# Patient Record
Sex: FEMALE | Race: OTHER | HISPANIC OR LATINO | Employment: OTHER | URBAN - METROPOLITAN AREA
[De-identification: names, ages, dates, MRNs, and addresses within clinical notes are randomized per-mention and may not be internally consistent; named-entity substitution may affect disease eponyms.]

---

## 2021-08-20 ENCOUNTER — APPOINTMENT (EMERGENCY)
Dept: RADIOLOGY | Facility: HOSPITAL | Age: 71
End: 2021-08-20

## 2021-08-20 ENCOUNTER — HOSPITAL ENCOUNTER (EMERGENCY)
Facility: HOSPITAL | Age: 71
Discharge: HOME | End: 2021-08-20
Attending: EMERGENCY MEDICINE | Admitting: EMERGENCY MEDICINE

## 2021-08-20 VITALS
HEART RATE: 70 BPM | HEIGHT: 60 IN | SYSTOLIC BLOOD PRESSURE: 115 MMHG | OXYGEN SATURATION: 98 % | RESPIRATION RATE: 18 BRPM | TEMPERATURE: 98.6 F | DIASTOLIC BLOOD PRESSURE: 42 MMHG

## 2021-08-20 DIAGNOSIS — J18.9 PNEUMONIA: Primary | ICD-10-CM

## 2021-08-20 DIAGNOSIS — R73.9 HYPERGLYCEMIA: ICD-10-CM

## 2021-08-20 LAB
ALBUMIN SERPL BCP-MCNC: 3.4 G/DL (ref 3.4–4.8)
ALP SERPL-CCNC: 102.8 U/L (ref 35–140)
ALT SERPL W P-5'-P-CCNC: 14 U/L (ref 5–54)
ANION GAP SERPL CALCULATED.3IONS-SCNC: 11 MMOL/L (ref 4–13)
APTT PPP: 28 SECONDS (ref 23–31)
AST SERPL W P-5'-P-CCNC: 21 U/L (ref 15–41)
BASE EXCESS BLDA CALC-SCNC: 3 MMOL/L (ref -2–3)
BASOPHILS # BLD MANUAL: 0 THOUSAND/UL (ref 0–0.1)
BASOPHILS NFR MAR MANUAL: 0 % (ref 0–1)
BILIRUB SERPL-MCNC: 0.32 MG/DL (ref 0.3–1.2)
BUN SERPL-MCNC: 49 MG/DL (ref 6–20)
CALCIUM ALBUM COR SERPL-MCNC: 9.5 MG/DL (ref 8.3–10.1)
CALCIUM SERPL-MCNC: 9 MG/DL (ref 8.4–10.2)
CHLORIDE SERPL-SCNC: 93 MMOL/L (ref 96–108)
CO2 SERPL-SCNC: 24 MMOL/L (ref 22–33)
CREAT SERPL-MCNC: 1.85 MG/DL (ref 0.4–1.1)
EOSINOPHIL # BLD MANUAL: 0.09 THOUSAND/UL (ref 0–0.4)
EOSINOPHIL NFR BLD MANUAL: 1 % (ref 0–6)
ERYTHROCYTE [DISTWIDTH] IN BLOOD BY AUTOMATED COUNT: 14.1 % (ref 11.6–15.1)
GFR SERPL CREATININE-BSD FRML MDRD: 27 ML/MIN/1.73SQ M
GIANT PLATELETS BLD QL SMEAR: PRESENT
GLUCOSE SERPL-MCNC: 406 MG/DL (ref 65–140)
GLUCOSE SERPL-MCNC: 431 MG/DL (ref 65–140)
HCO3 BLDA-SCNC: 28.5 MMOL/L (ref 24–30)
HCT VFR BLD AUTO: 34.9 % (ref 34.8–46.1)
HCT VFR BLD CALC: 34 % (ref 34.8–46.1)
HGB BLD-MCNC: 11.2 G/DL (ref 11.5–15.4)
HGB BLDA-MCNC: 11.6 G/DL (ref 11.5–15.4)
INR PPP: 1.05 (ref 0.9–1.1)
LACTATE SERPL-SCNC: 2.1 MMOL/L (ref 0–2)
LYMPHOCYTES # BLD AUTO: 1.77 THOUSAND/UL (ref 0.6–4.47)
LYMPHOCYTES # BLD AUTO: 19 % (ref 14–44)
MCH RBC QN AUTO: 26.4 PG (ref 26.8–34.3)
MCHC RBC AUTO-ENTMCNC: 32.1 G/DL (ref 31.4–37.4)
MCV RBC AUTO: 82 FL (ref 82–98)
MONOCYTES # BLD AUTO: 0.56 THOUSAND/UL (ref 0–1.22)
MONOCYTES NFR BLD: 6 % (ref 4–12)
NEUTROPHILS # BLD MANUAL: 6.91 THOUSAND/UL (ref 1.85–7.62)
NEUTS BAND NFR BLD MANUAL: 4 % (ref 0–8)
NEUTS SEG NFR BLD AUTO: 70 % (ref 43–75)
PCO2 BLD: 30 MMOL/L (ref 21–32)
PCO2 BLD: 48.8 MM HG (ref 42–50)
PH BLD: 7.38 [PH] (ref 7.3–7.4)
PLATELET # BLD AUTO: 217 THOUSANDS/UL (ref 149–390)
PLATELET BLD QL SMEAR: ADEQUATE
PMV BLD AUTO: 12 FL (ref 8.9–12.7)
PO2 BLD: <13 MM HG (ref 35–45)
POTASSIUM BLD-SCNC: 4.3 MMOL/L (ref 3.5–5.3)
POTASSIUM SERPL-SCNC: 4.4 MMOL/L (ref 3.5–5)
PROT SERPL-MCNC: 7.3 G/DL (ref 6.4–8.3)
PROTHROMBIN TIME: 11.8 SECONDS (ref 9.5–12.1)
RBC # BLD AUTO: 4.25 MILLION/UL (ref 3.81–5.12)
RBC MORPH BLD: NORMAL
SARS-COV-2 RNA RESP QL NAA+PROBE: NEGATIVE
SODIUM BLD-SCNC: 129 MMOL/L (ref 136–145)
SODIUM SERPL-SCNC: 128 MMOL/L (ref 133–145)
SPECIMEN SOURCE: ABNORMAL
WBC # BLD AUTO: 9.34 THOUSAND/UL (ref 4.31–10.16)

## 2021-08-20 PROCEDURE — 71045 X-RAY EXAM CHEST 1 VIEW: CPT

## 2021-08-20 PROCEDURE — 85730 THROMBOPLASTIN TIME PARTIAL: CPT | Performed by: STUDENT IN AN ORGANIZED HEALTH CARE EDUCATION/TRAINING PROGRAM

## 2021-08-20 PROCEDURE — 82947 ASSAY GLUCOSE BLOOD QUANT: CPT

## 2021-08-20 PROCEDURE — 83605 ASSAY OF LACTIC ACID: CPT | Performed by: STUDENT IN AN ORGANIZED HEALTH CARE EDUCATION/TRAINING PROGRAM

## 2021-08-20 PROCEDURE — 85014 HEMATOCRIT: CPT

## 2021-08-20 PROCEDURE — 96360 HYDRATION IV INFUSION INIT: CPT

## 2021-08-20 PROCEDURE — 87635 SARS-COV-2 COVID-19 AMP PRB: CPT | Performed by: STUDENT IN AN ORGANIZED HEALTH CARE EDUCATION/TRAINING PROGRAM

## 2021-08-20 PROCEDURE — 99285 EMERGENCY DEPT VISIT HI MDM: CPT

## 2021-08-20 PROCEDURE — 85610 PROTHROMBIN TIME: CPT | Performed by: STUDENT IN AN ORGANIZED HEALTH CARE EDUCATION/TRAINING PROGRAM

## 2021-08-20 PROCEDURE — 85007 BL SMEAR W/DIFF WBC COUNT: CPT | Performed by: STUDENT IN AN ORGANIZED HEALTH CARE EDUCATION/TRAINING PROGRAM

## 2021-08-20 PROCEDURE — 87040 BLOOD CULTURE FOR BACTERIA: CPT | Performed by: STUDENT IN AN ORGANIZED HEALTH CARE EDUCATION/TRAINING PROGRAM

## 2021-08-20 PROCEDURE — 84132 ASSAY OF SERUM POTASSIUM: CPT

## 2021-08-20 PROCEDURE — 82803 BLOOD GASES ANY COMBINATION: CPT

## 2021-08-20 PROCEDURE — 85027 COMPLETE CBC AUTOMATED: CPT | Performed by: STUDENT IN AN ORGANIZED HEALTH CARE EDUCATION/TRAINING PROGRAM

## 2021-08-20 PROCEDURE — 84295 ASSAY OF SERUM SODIUM: CPT

## 2021-08-20 PROCEDURE — 36600 WITHDRAWAL OF ARTERIAL BLOOD: CPT

## 2021-08-20 PROCEDURE — 80053 COMPREHEN METABOLIC PANEL: CPT | Performed by: STUDENT IN AN ORGANIZED HEALTH CARE EDUCATION/TRAINING PROGRAM

## 2021-08-20 PROCEDURE — 99285 EMERGENCY DEPT VISIT HI MDM: CPT | Performed by: STUDENT IN AN ORGANIZED HEALTH CARE EDUCATION/TRAINING PROGRAM

## 2021-08-20 PROCEDURE — 36415 COLL VENOUS BLD VENIPUNCTURE: CPT | Performed by: STUDENT IN AN ORGANIZED HEALTH CARE EDUCATION/TRAINING PROGRAM

## 2021-08-20 RX ORDER — AZITHROMYCIN 250 MG/1
500 TABLET, FILM COATED ORAL ONCE
Status: COMPLETED | OUTPATIENT
Start: 2021-08-20 | End: 2021-08-20

## 2021-08-20 RX ORDER — AZITHROMYCIN 250 MG/1
TABLET, FILM COATED ORAL
Qty: 5 TABLET | Refills: 0 | Status: SHIPPED | OUTPATIENT
Start: 2021-08-20 | End: 2021-08-24

## 2021-08-20 RX ADMIN — AZITHROMYCIN MONOHYDRATE 500 MG: 250 TABLET ORAL at 04:00

## 2021-08-20 RX ADMIN — SODIUM CHLORIDE 1000 ML: 0.9 INJECTION, SOLUTION INTRAVENOUS at 03:03

## 2021-08-20 NOTE — DISCHARGE INSTRUCTIONS
Begin azithromycin 1 tab daily for the next 5 days  Continue with ibuprofen and Tylenol as needed for pain and fever reduction  Follow-up with primary care within the next week re-evaluation pneumonia and for evaluation of hyperglycemia  Return to the ED with worsening symptoms including fevers greater than 104°F, difficulty breathing/shortness of breath, chest pain, altered mental status

## 2021-08-20 NOTE — ED PROVIDER NOTES
History  Chief Complaint   Patient presents with    Shortness of Breath     presents from urgent care for SOB and pneumonia     Patient is a 68-year-old female past medical history of diabetes and COVID-19 requiring intubation and ventilator 4 months ago who presents ED today with complaint of shortness of breath  Patient went to an urgent care earlier today and was diagnosed with pneumonia and advised to return to presents to the ED for further evaluation and management  Patient's grandson states approximately 6 days ago the patient received her 2nd dose of the [place] [employer] COVID-19 vaccination and a day following she began developing fevers but states they have no recorded values but she felt warm and have persisted until today  Approximately 2 days ago she began developing shortness of breath and a dry cough which prompted her to present to the urgent care earlier today  Patient denies chest pain, headache, body aches, lightheadedness/dizziness, abdominal pain, constipation/diarrhea, nausea/vomiting, dysuria  History provided by:  Patient   used: Yes (Patient's grandson)    Shortness of Breath  Associated symptoms: cough and fever    Associated symptoms: no abdominal pain, no chest pain, no ear pain, no headaches, no neck pain, no rash, no sore throat and no vomiting        None       Past Medical History:   Diagnosis Date    Diabetes mellitus ([facility] [address] )     Hypertension        History reviewed  No pertinent surgical history  History reviewed  No pertinent family history  I have reviewed and agree with the history as documented  E-Cigarette/Vaping     E-Cigarette/Vaping Substances     Social History     Tobacco Use    Smoking status: Never Smoker    Smokeless tobacco: Never Used   Substance Use Topics    Alcohol use: Never    Drug use: Never       Review of Systems   Constitutional: Positive for fatigue and fever  Negative for chills     HENT: Negative for congestion, ear pain, rhinorrhea, sinus pain and sore throat  Eyes: Negative for pain and visual disturbance  Respiratory: Positive for cough and shortness of breath  Negative for chest tightness  Cardiovascular: Negative for chest pain and palpitations  Gastrointestinal: Negative for abdominal pain, constipation, diarrhea, rectal pain and vomiting  Genitourinary: Negative for dysuria, frequency, hematuria and urgency  Musculoskeletal: Negative for arthralgias, back pain, myalgias and neck pain  Skin: Negative for color change and rash  Neurological: Negative for dizziness, seizures, syncope, weakness, light-headedness and headaches  All other systems reviewed and are negative  Physical Exam  Physical Exam  Vitals and nursing note reviewed  Constitutional:       General: She is not in acute distress  Appearance: She is well-developed  She is not ill-appearing  HENT:      Head: Normocephalic and atraumatic  Right Ear: External ear normal       Left Ear: External ear normal       Nose: Nose normal  No congestion or rhinorrhea  Mouth/Throat:      Mouth: Mucous membranes are moist       Pharynx: Oropharynx is clear  No pharyngeal swelling or oropharyngeal exudate  Eyes:      Extraocular Movements: Extraocular movements intact  Cardiovascular:      Rate and Rhythm: Normal rate and regular rhythm  Heart sounds: Normal heart sounds  No murmur heard  Pulmonary:      Effort: Pulmonary effort is normal  No accessory muscle usage or respiratory distress  Breath sounds: Examination of the left-lower field reveals rales  Rales present  No decreased breath sounds  Chest:      Chest wall: No tenderness  Abdominal:      General: Bowel sounds are normal       Palpations: Abdomen is soft  Tenderness: There is no abdominal tenderness  There is no guarding or rebound  Musculoskeletal:         General: Normal range of motion  Cervical back: Neck supple     Lymphadenopathy: Cervical: No cervical adenopathy  Skin:     General: Skin is warm and dry  Neurological:      General: No focal deficit present  Mental Status: She is alert and oriented to person, place, and time  Sensory: Sensation is intact  Motor: Motor function is intact  Psychiatric:         Attention and Perception: Attention normal          Mood and Affect: Mood normal          Behavior: Behavior normal          Vital Signs  ED Triage Vitals [08/20/21 0026]   Temperature Pulse Respirations Blood Pressure SpO2   98 6 °F (37 °C) 70 18 (!) 115/42 98 %      Temp Source Heart Rate Source Patient Position - Orthostatic VS BP Location FiO2 (%)   Oral Monitor Lying Right arm --      Pain Score       No Pain           Vitals:    08/20/21 0026   BP: (!) 115/42   Pulse: 70   Patient Position - Orthostatic VS: Lying         Visual Acuity      ED Medications  Medications   sodium chloride 0 9 % bolus 1,000 mL (0 mL Intravenous Stopped 8/20/21 0403)   azithromycin (ZITHROMAX) tablet 500 mg (500 mg Oral Given 8/20/21 0400)       Diagnostic Studies  Results Reviewed     Procedure Component Value Units Date/Time    Novel Coronavirus (Covid-19),PCR [facility] - 2 Hour Stat [[id number]]  (Normal) Collected: 08/20/21 0158    Lab Status: Final result Specimen: Nares from Nasopharyngeal Swab Updated: 08/20/21 0258     SARS-CoV-2 Negative    Narrative: The specimen collection materials, transport medium, and/or testing methodology utilized in the production of these test results have been proven to be reliable in a limited validation with an abbreviated program under the Emergency Utilization Authorization provided by the FDA  Testing reported as "Presumptive positive" will be confirmed with secondary testing to ensure result accuracy  Clinical caution and judgement should be used with the interpretation of these results with consideration of the clinical impression and other laboratory testing    Testing reported as "Positive" or "Negative" has been proven to be accurate according to standard laboratory validation requirements  All testing is performed with control materials showing appropriate reactivity at standard intervals  CBC and differential [[id number]]  (Abnormal) Collected: 08/20/21 0158    Lab Status: Final result Specimen: Blood from Arm, Left Updated: 08/20/21 0251     WBC 9 34 Thousand/uL      RBC 4 25 Million/uL      Hemoglobin 11 2 g/dL      Hematocrit 34 9 %      MCV 82 fL      MCH 26 4 pg      MCHC 32 1 g/dL      RDW 14 1 %      MPV 12 0 fL      Platelets 153 Thousands/uL     Manual Differential(PHLEBS Do Not Order) [[id number]] Collected: 08/20/21 0158    Lab Status: Final result Specimen: Blood from Arm, Left Updated: 08/20/21 0251     Segmented % 70 %      Bands % 4 %      Lymphocytes % 19 %      Monocytes % 6 %      Eosinophils, % 1 %      Basophils % 0 %      Absolute Neutrophils 6 91 Thousand/uL      Lymphocytes Absolute 1 77 Thousand/uL      Monocytes Absolute 0 56 Thousand/uL      Eosinophils Absolute 0 09 Thousand/uL      Basophils Absolute 0 00 Thousand/uL      Total Counted --     RBC Morphology Normal     Platelet Estimate Adequate     Giant PLTs Present    Lactic acid [[id number]]  (Abnormal) Collected: 08/20/21 0158    Lab Status: Final result Specimen: Blood from Arm, Left Updated: 08/20/21 0237     LACTIC ACID 2 1 mmol/L     Narrative:      Result may be elevated if tourniquet was used during collection      Lactic acid 2 Hours [[id number]]     Lab Status: No result Specimen: Blood     Comprehensive metabolic panel [[id number]]  (Abnormal) Collected: 08/20/21 0158    Lab Status: Final result Specimen: Blood from Arm, Left Updated: 08/20/21 0234     Sodium 128 mmol/L      Potassium 4 4 mmol/L      Chloride 93 mmol/L      CO2 24 mmol/L      ANION GAP 11 mmol/L      BUN 49 mg/dL      Creatinine 1 85 mg/dL      Glucose 406 mg/dL      Calcium 9 0 mg/dL      Corrected Calcium 9 5 mg/dL      AST 21 U/L      ALT 14 U/L      Alkaline Phosphatase 102 8 U/L      Total Protein 7 3 g/dL      Albumin 3 4 g/dL      Total Bilirubin 0 32 mg/dL      eGFR 27 ml/min/1 73sq m     Narrative:      [place] guidelines for Chronic Kidney Disease (CKD):     Stage 1 with normal or high GFR (GFR > 90 mL/min/1 73 square meters)    Stage 2 Mild CKD (GFR = 60-89 mL/min/1 73 square meters)    Stage 3A Moderate CKD (GFR = 45-59 mL/min/1 73 square meters)    Stage 3B Moderate CKD (GFR = 30-44 mL/min/1 73 square meters)    Stage 4 Severe CKD (GFR = 15-29 mL/min/1 73 square meters)    Stage 5 End Stage CKD (GFR <15 mL/min/1 73 square meters)  Note: GFR calculation is accurate only with a steady state creatinine    Protime-INR [[id number]]  (Normal) Collected: 08/20/21 0158    Lab Status: Final result Specimen: Blood from Arm, Left Updated: 08/20/21 0213     Protime 11 8 seconds      INR 1 05    Narrative:      INR Reference Ranges:  No Anticoagulant, Normal:           0 9-1 1  Standard Dose, Oral Anticoagulant:  2 0-3 0  High Dose, Oral Anticoagulant:      2 5-3 5    APTT [[id number]]  (Normal) Collected: 08/20/21 0158    Lab Status: Final result Specimen: Blood from Arm, Left Updated: 08/20/21 0213     PTT 28 seconds     Blood culture #2 [[id number]] Collected: 08/20/21 0158    Lab Status: In process Specimen: Blood from Arm, Left Updated: 08/20/21 0201    Procalcitonin with AM Reflex [[id number]] Collected: 08/20/21 0158    Lab Status: No result Specimen: Blood from Arm, Left     Blood culture #1 [[id number]] Collected: 08/20/21 0126    Lab Status:  In process Specimen: Blood from Arm, Left Updated: 08/20/21 0150    POCT Blood Gas (CG8+) [[id number]]  (Abnormal) Collected: 08/20/21 0105    Lab Status: Final result Specimen: Venous Updated: 08/20/21 0110     ph, Carlos ISTAT 7 375     pCO2, Carlos i-STAT 48 8 mm HG      pO2, Carlos i-STAT <13 0 mm HG      BE, i-STAT 3 mmol/L      HCO3, Carlos i-STAT 28 5 mmol/L      CO2, i-STAT 30 mmol/L      O2 Sat, i-STAT --     SODIUM, I-STAT 129 mmol/l      Potassium, i-STAT 4 3 mmol/L      Hct, i-STAT 34 %      Hgb, i-STAT 11 6 g/dl      Glucose, i-STAT 431 mg/dl      Specimen Type VENOUS    Blood gas, arterial [[id number]]     Lab Status: No result Specimen: Blood, Arterial                  XR chest 1 view portable   ED Interpretation by Josse Monroy PA-C (08/20 0208)   Left lower lobe consolidation      Final Result by Kranthi Black (08/20 0340)      There is patchy opacity in the left lower lobe of the lung which is suspicious for pneumonia  Short-term follow-up after a course of treatment is recommended in order to ensure complete resolution  This examination demonstrates findings for which imaging follow-up is recommended and was logged as such in [address] [place] [address]  The study was marked in [facility] for immediate notification  Workstation performed: [id number]                    Procedures  ECG 12 Lead Documentation Only    Date/Time: 8/20/2021 4:28 AM  Performed by: Josse Monroy PA-C  Authorized by: Josse Monroy PA-C     Indications / Diagnosis:  Sob  ECG reviewed by me, the ED Provider: yes    Patient location:  ED  Previous ECG:     Previous ECG:  Unavailable  Quality:     Tracing quality:  Limited by artifact  Rate:     ECG rate:  65    ECG rate assessment: normal    Rhythm:     Rhythm: sinus rhythm    Ectopy:     Ectopy: none    QRS:     QRS axis:  Left    QRS intervals:  Normal  Conduction:     Conduction: normal    ST segments:     ST segments:  Normal  T waves:     T waves: normal               ED Course  ED Course as of Aug 20 0438   Fri Aug 20, 2021   0208 IMPRESSION:     There is patchy opacity in the left lower lobe of the lung which is suspicious for pneumonia  Short-term follow-up after a course of treatment is recommended in order to ensure complete resolution     XR chest 1 view portable                                           MDM  Number of Diagnoses or Management Options  Hyperglycemia  Pneumonia  Diagnosis management comments: Patient 41-year-old female who presents ED today following being evaluated at an urgent care clinic for suspected pneumonia  Examination showed rales in the left lower lung field was otherwise unremarkable    Sepsis workup was initiated  VBG ordered due to recent hospitalization with COVID-19 requiring intubation and being +ventilator for 16 days  - VBG results were within normal limits and non concerning  CBC showed mild the decreased hemoglobin 11 2, no elevated WBC otherwise WNL  CMP showed decreased sodium of 128, elevated creatinine of 1 86 likely due to dehydration and glucose of 406  - patient given 1 L of normal saline for hyponatremia and elevated creatinine  Lactic acid is 2 1  - patient only meets 1 sirs criteria  - no suspicion for sepsis  Chest x-ray showed left lower lobe consolidation suspicious for pneumonia  COVID-19 was negative  EKG showed normal sinus rhythm    Patient diagnosed with right lower lobe pneumonia and started azithromycin for 5 days and 1st dose in the ED prior to discharge  Patient advised to follow-up primary care within the next week for further evaluation of pneumonia as well as hyperglycemia and elevated creatinine  Return to the ED with worsening symptoms as discussed patient  Patient stable upon discharge           Amount and/or Complexity of Data Reviewed  Clinical lab tests: ordered and reviewed  Tests in the radiology section of CPT®: ordered and reviewed    Patient Progress  Patient progress: stable      Disposition  Final diagnoses:   Pneumonia   Hyperglycemia     Time reflects when diagnosis was documented in both MDM as applicable and the Disposition within this note     Time User Action Codes Description Comment    8/20/2021  3:57 AM Sean Arambula Add [J18 9] Pneumonia     8/20/2021  4:05 AM Sean Arambula Add [R73 9] Hyperglycemia       ED Disposition     ED Disposition Condition Date/Time Comment    Discharge Stable Fri Aug 20, 2021  3:57 AM Pranav Loera discharge to home/self care  Follow-up Information     Follow up With Specialties Details Why Contact Info Additional Information    [facility] [address] [facility] Family Medicine Schedule an appointment as soon as possible for a visit in 1 week for reevaluation TREVA Zamudio [phone]  [facility] [address] [facility], [address] [facility] [address], [place], [place], [address], [address] [facility] Emergency Department Emergency Medicine Go to  As needed, If symptoms worsen [address] [address] Emergency Department, [address], [address]          Discharge Medication List as of 8/20/2021  4:07 AM      START taking these medications    Details   azithromycin (ZITHROMAX) 250 mg tablet Take 1 tablet daily x 5 days, Normal           No discharge procedures on file      PDMP Review     None          ED Provider  Electronically Signed by           Aida Cabrera PA-C  08/20/21 4942

## 2021-08-20 NOTE — RESPIRATORY THERAPY NOTE
ABG ordered  Attempted to collect arterial blood gas, blood collected was really dark and results are consistent with venus blood  Results will post as ABG due to epic ABG order but they are venous   Venous Blood gas results at 8/20/2021 01:00 as follows     pH:7 375   PCO2: 48 8   PO2:<13  BE,B: 3   HCO3: 28 5   TCO2: 30    Na:129  K: 4 3  Glu:431  Hct:34  Hb:11 6

## 2021-08-25 LAB
BACTERIA BLD CULT: NORMAL
BACTERIA BLD CULT: NORMAL